# Patient Record
Sex: FEMALE | Race: WHITE | Employment: FULL TIME | URBAN - METROPOLITAN AREA
[De-identification: names, ages, dates, MRNs, and addresses within clinical notes are randomized per-mention and may not be internally consistent; named-entity substitution may affect disease eponyms.]

---

## 2017-05-17 ENCOUNTER — HOSPITAL ENCOUNTER (OUTPATIENT)
Dept: PREADMISSION TESTING | Age: 67
Discharge: HOME | End: 2017-05-17
Payer: COMMERCIAL

## 2017-05-17 DIAGNOSIS — M25.519 PAIN IN JOINT, SHOULDER REGION: ICD-10-CM

## 2017-05-17 LAB
ATRIAL RATE: 51 BPM
CALCULATED P AXIS, ECG09: 61 DEGREES
CALCULATED R AXIS, ECG10: 48 DEGREES
CALCULATED T AXIS, ECG11: 69 DEGREES
DIAGNOSIS, 93000: NORMAL
P-R INTERVAL, ECG05: 156 MS
Q-T INTERVAL, ECG07: 458 MS
QRS DURATION, ECG06: 88 MS
QTC CALCULATION (BEZET), ECG08: 422 MS
VENTRICULAR RATE, ECG03: 51 BPM

## 2017-05-17 PROCEDURE — 93005 ELECTROCARDIOGRAM TRACING: CPT

## 2024-01-31 ENCOUNTER — HOSPITAL ENCOUNTER (OUTPATIENT)
Facility: HOSPITAL | Age: 74
Setting detail: RECURRING SERIES
Discharge: HOME | End: 2024-02-03
Payer: MEDICARE

## 2024-01-31 PROCEDURE — 97110 THERAPEUTIC EXERCISES: CPT

## 2024-01-31 PROCEDURE — 97161 PT EVAL LOW COMPLEX 20 MIN: CPT

## 2024-01-31 NOTE — PROGRESS NOTES
PT DAILY TREATMENT NOTE/SHOULDER EVAL 10-18    Patient Name: Saige Shoemaker  Date:2024  : 1950  [x]  Patient  Verified  Payor: MEDICARE / Plan: MEDICARE PART A AND B / Product Type: *No Product type* /    In time:1330  Out time:1410  Total Treatment Time (min): 40  Visit #: 1 of 16    Medicare/BCBS Only   Total Timed Codes (min):  10 1:1 Treatment Time:  40       Treatment Area: Pain in left shoulder [M25.512]  Adhesive capsulitis of left shoulder [M75.02]    SUBJECTIVE  Pain Level (0-10 scale): 0-5  []constant [x]intermittent []improving []worsening []no change since onset    Any medication changes, allergies to medications, adverse drug reactions, diagnosis change, or new procedure performed?: [x] No    [] Yes (see summary sheet for update)  Subjective functional status/changes:       Patient presents with c/o left shoulder pain since 2023 with no known ALEXANDRA.  Patient describes pain as sharp, ache. Denies numbness/tingling. Denies popping/clicking. Aggravating factors:overhead activities, reaching behind, fastening a bra. Alleviating factors: IBP.  Denies red flags: SOB, chest pain, dizziness/lightheadedness, blurred/double vision, HA, chills/fevers, night sweats, change in bowel/bladder control, abdominal pain, difficulty swallowing, slurred speech, unexplained weight gain/loss, nausea, vomiting. PMHx: OA. Surgical Hx: right shoulder. Social Hx:  lives with spouse in a 2 story home, work status. PLOF: Retired. CLOF: Goes to [facility] weekly, yard work.  Diagnostic Imaging: X-ray: OA of the GH joint    OBJECTIVE/EXAMINATION    30 min [x]Eval                  []Re-Eval       10 min Therapeutic Exercise:  [x] See flow sheet :   Rationale: increase ROM, increase strength and decrease pain to improve the patient’s ability to complete ADLs            With   [x] TE   [] TA   [] neuro   [] other: Patient Education: [x] Review HEP    [] Progressed/Changed HEP based on:   [] positioning   [] body 
weeks:   Patient will report compliance with HEP a least 3-4x/week to aid in rehabilitation/strengthening program.   Status at IE: NA   Current:Same as IE     Patient will report no pain greater than 1-2/10 with overhead activities to aid in completion of ADLs.   Status at IE: 5/10   Current:Same as IE     Patient will increase bilateral UE strength to 5/5 throughout all planes to aid in completion of ADLs.   Status at IE: 4-/5   Current:Same as IE     Patient will increase FOTO score to 62 points overall to demonstrate improvement in functional status.    Status at IE: FOTO score = 46 (an established functional score where 100 = no disability)   Current:Same as IE   Frequency / Duration: Patient to be seen 2 times per week for 8 weeks    Patient/ Caregiver education and instruction: Diagnosis, prognosis, self care, activity modification, and exercises     [x]  Plan of care has been reviewed with PTA    Certification Period: 1/31/2024 - 4/30/2024  Diaz Cherry, PT 1/31/2024 5:33 PM  _____________________________________________________________________  I certify that the above Therapy Services are being furnished while the patient is under my care. I agree with the treatment plan and certify that this therapy is necessary.    Physician's Signature:_______________________ Date:_________ TIME:________                              Diaz Bell MD  Insurance: Payor: MEDICARE / Plan: MEDICARE PART A AND B / Product Type: *No Product type* /      ** Signature, Date and Time must be completed for valid certification **    [facility]  [address] [address]  Phone: [phone]   Fax: [phone]

## 2024-02-01 ENCOUNTER — TELEPHONE (OUTPATIENT)
Facility: HOSPITAL | Age: 74
End: 2024-02-01

## 2024-02-05 ENCOUNTER — TELEPHONE (OUTPATIENT)
Facility: HOSPITAL | Age: 74
End: 2024-02-05

## 2024-02-05 ENCOUNTER — HOSPITAL ENCOUNTER (OUTPATIENT)
Facility: HOSPITAL | Age: 74
Setting detail: RECURRING SERIES
Discharge: HOME | End: 2024-02-08
Payer: MEDICARE

## 2024-02-05 PROCEDURE — 97530 THERAPEUTIC ACTIVITIES: CPT

## 2024-02-05 PROCEDURE — 97112 NEUROMUSCULAR REEDUCATION: CPT

## 2024-02-05 PROCEDURE — 97110 THERAPEUTIC EXERCISES: CPT

## 2024-02-07 ENCOUNTER — HOSPITAL ENCOUNTER (OUTPATIENT)
Facility: HOSPITAL | Age: 74
Setting detail: RECURRING SERIES
Discharge: HOME | End: 2024-02-10
Payer: MEDICARE

## 2024-02-07 PROCEDURE — 97530 THERAPEUTIC ACTIVITIES: CPT

## 2024-02-07 PROCEDURE — 97112 NEUROMUSCULAR REEDUCATION: CPT

## 2024-02-07 PROCEDURE — 97110 THERAPEUTIC EXERCISES: CPT

## 2024-02-07 PROCEDURE — 97016 VASOPNEUMATIC DEVICE THERAPY: CPT

## 2024-02-07 NOTE — PROGRESS NOTES
PHYSICAL / OCCUPATIONAL THERAPY - DAILY TREATMENT NOTE     Patient Name: Saige Shoemaker    Date: 2024    : 1950  Insurance: Payor: MEDICARE / Plan: MEDICARE PART A AND B / Product Type: *No Product type* /      Patient  verified Yes     Visit #   Current / Total 3 16   Time   In / Out 0845 0927   Pain   In / Out 3 1   Subjective Functional Status/Changes: Patient reports that she is seeing a difference in mobility with exercise prescribed.     Changes to:  Allergies, Med Hx, Sx Hx?   no       TREATMENT AREA =  Pain in left shoulder [M25.512]    OBJECTIVE    Modalities Rationale:     decrease edema, decrease inflammation, decrease pain, increase tissue extensibility, and increase muscle contraction/control to improve patient's ability to progress to PLOF and address remaining functional goals.    10 min [x]  Vasopneumatic Device, press/temp: Med/34 degrees   If using vaso (only need to measure limb vaso being performed on)      pre-treatment girth : 44.0 cm      post-treatment girth : 44.0 cm      measured at (landmark location) :  left shoulder at acromion    min []  Other:    Skin assessment post-treatment (if applicable):    []  intact    []  redness- no adverse reaction                 []redness - adverse reaction:         Therapeutic Procedures:  Tx Min Billable or 1:1 Min (if diff from Tx Min) Procedure, Rationale, Specifics   20  21235 Therapeutic Exercise (timed):  increase ROM, strength, coordination, balance, and proprioception to improve patient's ability to progress to PLOF and address remaining functional goals. (see flow sheet as applicable)    Details if applicable:       15  01877 Neuromuscular Re-Education (timed):  improve balance, coordination, kinesthetic sense, posture, core stability and proprioception to improve patient's ability to develop conscious control of individual muscles and awareness of position of extremities in order to progress to PLOF and address remaining functional

## 2024-02-12 ENCOUNTER — APPOINTMENT (OUTPATIENT)
Facility: HOSPITAL | Age: 74
End: 2024-02-12
Payer: MEDICARE

## 2024-02-12 ENCOUNTER — TELEPHONE (OUTPATIENT)
Facility: HOSPITAL | Age: 74
End: 2024-02-12

## 2024-02-14 ENCOUNTER — HOSPITAL ENCOUNTER (OUTPATIENT)
Facility: HOSPITAL | Age: 74
Setting detail: RECURRING SERIES
Discharge: HOME | End: 2024-02-17
Payer: MEDICARE

## 2024-02-14 PROCEDURE — 97016 VASOPNEUMATIC DEVICE THERAPY: CPT

## 2024-02-14 PROCEDURE — 97110 THERAPEUTIC EXERCISES: CPT

## 2024-02-14 PROCEDURE — 97112 NEUROMUSCULAR REEDUCATION: CPT

## 2024-02-14 PROCEDURE — 97530 THERAPEUTIC ACTIVITIES: CPT

## 2024-02-14 NOTE — PROGRESS NOTES
analyze and address imbalance/dizziness, and instruct in home and community integration to address functional deficits and attain remaining goals.    Progress toward goals / Updated goals:  []  See Progress Note/Recertification    Short Term Goals: To be accomplished in 4 weeks:                 Patient will report compliance with HEP at least 1x/day to aid in rehabilitation program.                 Status at IE: provided initial HEP                  Current: In-progress, advanced HEP by adding isometrics, 2/7/2024                    Patient will display full left shoulder pain free AROM into flexion and abduction to aid in completion of ADLs.                 Status at IE: 5/10 pain 115 degrees flexion and 100 degrees abduction                 Current:In-progress, 120 degrees with 1-2/10, 2/14/2024     Long Term Goals: To be accomplished in 8 weeks:                 Patient will report compliance with HEP a least 3-4x/week to aid in rehabilitation/strengthening program.                 Status at IE: NA                 Current:Same as IE                    Patient will report no pain greater than 1-2/10 with overhead activities to aid in completion of ADLs.                 Status at IE: 5/10                 Current:Same as IE                    Patient will increase bilateral UE strength to 5/5 throughout all planes to aid in completion of ADLs.                 Status at IE: 4-/5                 Current:Same as IE                    Patient will increase FOTO score to 62 points overall to demonstrate improvement in functional status.                  Status at IE: FOTO score = 46 (an established functional score where 100 = no disability)                 Current:Same as IE     PLAN  Yes  Continue plan of care  []  Upgrade activities as tolerated  []  Discharge due to :  []  Other:    Diaz Cherry, PT    2/14/2024    8:53 AM    Future Appointments   Date Time Provider Department Center   2/20/2024  8:50 AM Jo Ann

## 2024-02-20 ENCOUNTER — HOSPITAL ENCOUNTER (OUTPATIENT)
Facility: HOSPITAL | Age: 74
Setting detail: RECURRING SERIES
Discharge: HOME | End: 2024-02-23
Payer: MEDICARE

## 2024-02-20 PROCEDURE — 97110 THERAPEUTIC EXERCISES: CPT

## 2024-02-20 PROCEDURE — 97016 VASOPNEUMATIC DEVICE THERAPY: CPT

## 2024-02-20 PROCEDURE — 97112 NEUROMUSCULAR REEDUCATION: CPT

## 2024-02-20 PROCEDURE — 97530 THERAPEUTIC ACTIVITIES: CPT

## 2024-02-20 NOTE — PROGRESS NOTES
modify for postural abnormalities, analyze and address imbalance/dizziness, and instruct in home and community integration to address functional deficits and attain remaining goals.    Progress toward goals / Updated goals:  []  See Progress Note/Recertification    Short Term Goals: To be accomplished in 4 weeks:                 Patient will report compliance with HEP at least 1x/day to aid in rehabilitation program.                 Status at IE: provided initial HEP                  Current: Met, okay                    Patient will display full left shoulder pain free AROM into flexion and abduction to aid in completion of ADLs.                 Status at IE: 5/10 pain 115 degrees flexion and 100 degrees abduction                 Current:In-progress, 120 degrees with 1-2/10, 2/14/2024     Long Term Goals: To be accomplished in 8 weeks:                 Patient will report compliance with HEP a least 3-4x/week to aid in rehabilitation/strengthening program.                 Status at IE: NA                 Current:Same as IE                    Patient will report no pain greater than 1-2/10 with overhead activities to aid in completion of ADLs.                 Status at IE: 5/10                 Current:Same as IE                    Patient will increase bilateral UE strength to 5/5 throughout all planes to aid in completion of ADLs.                 Status at IE: 4-/5                 Current:Same as IE                    Patient will increase FOTO score to 62 points overall to demonstrate improvement in functional status.                  Status at IE: FOTO score = 46 (an established functional score where 100 = no disability)                 Current:Same as IE     PLAN  Yes  Continue plan of care  []  Upgrade activities as tolerated  []  Discharge due to :  []  Other:    Diaz Cherry PT    2/20/2024    9:12 AM    Future Appointments   Date Time Provider Department Center   2/22/2024  8:50 AM Diaz Cherry, PT

## 2024-02-22 ENCOUNTER — HOSPITAL ENCOUNTER (OUTPATIENT)
Facility: HOSPITAL | Age: 74
Setting detail: RECURRING SERIES
Discharge: HOME | End: 2024-02-25
Payer: MEDICARE

## 2024-02-22 PROCEDURE — 97016 VASOPNEUMATIC DEVICE THERAPY: CPT

## 2024-02-22 PROCEDURE — 97110 THERAPEUTIC EXERCISES: CPT

## 2024-02-22 PROCEDURE — 97530 THERAPEUTIC ACTIVITIES: CPT

## 2024-02-22 PROCEDURE — 97112 NEUROMUSCULAR REEDUCATION: CPT

## 2024-02-22 NOTE — PROGRESS NOTES
PHYSICAL / OCCUPATIONAL THERAPY - DAILY TREATMENT NOTE     Patient Name: Saige Shoemaker    Date: 2024    : 1950  Insurance: Payor: MEDICARE / Plan: MEDICARE PART A AND B / Product Type: *No Product type* /      Patient  verified Yes     Visit #   Current / Total 6 16   Time   In / Out 0850 0950   Pain   In / Out 0 0   Subjective Functional Status/Changes: Patient that she she has less pain today.    Changes to:  Allergies, Med Hx, Sx Hx?   no       TREATMENT AREA =  Pain in left shoulder [M25.512]    OBJECTIVE    Modalities Rationale:     decrease edema, decrease inflammation, decrease pain, increase tissue extensibility, and increase muscle contraction/control to improve patient's ability to progress to PLOF and address remaining functional goals.    10 min [x]  Vasopneumatic Device, press/temp: Med/34 degrees   If using vaso (only need to measure limb vaso being performed on)      pre-treatment girth : 44.7 cm      post-treatment girth : 44.3 cm      measured at (landmark location) :  left shoulder at acromion    min []  Other:    Skin assessment post-treatment (if applicable):    []  intact    []  redness- no adverse reaction                 []redness - adverse reaction:         Therapeutic Procedures:  Tx Min Billable or 1:1 Min (if diff from Tx Min) Procedure, Rationale, Specifics   25  50140 Therapeutic Exercise (timed):  increase ROM, strength, coordination, balance, and proprioception to improve patient's ability to progress to PLOF and address remaining functional goals. (see flow sheet as applicable)    Details if applicable:       10  86888 Neuromuscular Re-Education (timed):  improve balance, coordination, kinesthetic sense, posture, core stability and proprioception to improve patient's ability to develop conscious control of individual muscles and awareness of position of extremities in order to progress to PLOF and address remaining functional goals. (see flow sheet as

## 2024-02-26 ENCOUNTER — HOSPITAL ENCOUNTER (OUTPATIENT)
Facility: HOSPITAL | Age: 74
Setting detail: RECURRING SERIES
Discharge: HOME | End: 2024-02-29
Payer: MEDICARE

## 2024-02-26 PROCEDURE — 97112 NEUROMUSCULAR REEDUCATION: CPT

## 2024-02-26 PROCEDURE — 97530 THERAPEUTIC ACTIVITIES: CPT

## 2024-02-26 PROCEDURE — 97016 VASOPNEUMATIC DEVICE THERAPY: CPT

## 2024-02-26 PROCEDURE — 97140 MANUAL THERAPY 1/> REGIONS: CPT

## 2024-02-26 PROCEDURE — 97110 THERAPEUTIC EXERCISES: CPT

## 2024-02-26 NOTE — PROGRESS NOTES
Other:    Ilir Bolanos, PT    2/26/2024    2:30 PM    Future Appointments   Date Time Provider Department Center   3/5/2024  8:10 AM Diaz Cherry, PT [facility]   3/7/2024 12:10 PM Diaz Cherry, PT [facility]   3/12/2024  8:10 AM Diaz Cherry, PT [facility]   3/14/2024  8:10 AM Diaz Cherry, PT [facility]   3/19/2024  8:10 AM Diaz Cherry, PT [facility]

## 2024-03-05 ENCOUNTER — HOSPITAL ENCOUNTER (OUTPATIENT)
Facility: HOSPITAL | Age: 74
Setting detail: RECURRING SERIES
Discharge: HOME | End: 2024-03-08
Payer: MEDICARE

## 2024-03-05 PROCEDURE — 97530 THERAPEUTIC ACTIVITIES: CPT

## 2024-03-05 PROCEDURE — 97140 MANUAL THERAPY 1/> REGIONS: CPT

## 2024-03-05 PROCEDURE — 97112 NEUROMUSCULAR REEDUCATION: CPT

## 2024-03-05 PROCEDURE — 97110 THERAPEUTIC EXERCISES: CPT

## 2024-03-05 NOTE — PROGRESS NOTES
[facility]  [address] [address]            Phone: [phone]   Fax: [phone]    Progress Note  Patient name: Saige Shoemaker Start of Care:  2024    Referral source: Diaz Bell MD : 1950   Medical/Treatment Diagnosis: Pain in left shoulder [M25.512] Onset Date:2023      Prior Hospitalization: see medical history Provider#: [id number]   Medications: Verified on Patient Summary List    Comorbidities:  OA   Prior Level of Function:Goes to [facility] weekly, yard work, retired    Visits from Start of Care: 8        Updated Goals/Measure of Progress:     Short Term Goals: To be accomplished in 4 weeks:                 Patient will report compliance with HEP at least 1x/day to aid in rehabilitation program.                 Status at IE: provided initial HEP                  Current: Met, 2024                    Patient will display full left shoulder pain free AROM into flexion and abduction to aid in completion of ADLs.                 Status at IE: 5/10 pain 115 degrees flexion and 100 degrees abduction                 Current:In-progress, 145 degrees flexion and 20 abduction with 1-2/10, 3/5/2024     Long Term Goals: To be accomplished in 8 weeks:                 Patient will report compliance with HEP a least 3-4x/week to aid in rehabilitation/strengthening program.                 Status at IE: NA                 Current:  In-progress, developing consistency, 3/5/2024                     Patient will report no pain greater than 1-2/10 with overhead activities to aid in completion of ADLs.                 Status at IE: 5/10                 Current: In-progress, 2-3 with OH activities, 3/5/2024                    Patient will increase bilateral UE strength to 5/5 throughout all planes to aid in completion of ADLs.                 Status at IE: 4-/                 Current: In-progress, , 3/5/2024                    Patient will increase

## 2024-03-07 ENCOUNTER — HOSPITAL ENCOUNTER (OUTPATIENT)
Facility: HOSPITAL | Age: 74
Setting detail: RECURRING SERIES
Discharge: HOME | End: 2024-03-10
Payer: MEDICARE

## 2024-03-07 PROCEDURE — 97110 THERAPEUTIC EXERCISES: CPT

## 2024-03-07 PROCEDURE — 97112 NEUROMUSCULAR REEDUCATION: CPT

## 2024-03-07 PROCEDURE — 97530 THERAPEUTIC ACTIVITIES: CPT

## 2024-03-07 PROCEDURE — 97016 VASOPNEUMATIC DEVICE THERAPY: CPT

## 2024-03-07 NOTE — PROGRESS NOTES
Discharge due to :  []  Other:    Diaz Cherry, PT    3/7/2024    12:16 PM    Future Appointments   Date Time Provider Department Center   3/12/2024  8:10 AM Diaz Cherry, PT [facility]   3/14/2024  8:10 AM Diaz Cherry, PT [facility]   3/19/2024  8:10 AM Diaz Cherry, PT [facility]

## 2024-03-11 ENCOUNTER — HOSPITAL ENCOUNTER (OUTPATIENT)
Facility: HOSPITAL | Age: 74
Setting detail: RECURRING SERIES
Discharge: HOME | End: 2024-03-14
Payer: MEDICARE

## 2024-03-11 PROCEDURE — 97110 THERAPEUTIC EXERCISES: CPT

## 2024-03-11 PROCEDURE — 97140 MANUAL THERAPY 1/> REGIONS: CPT

## 2024-03-11 PROCEDURE — 97112 NEUROMUSCULAR REEDUCATION: CPT

## 2024-03-11 PROCEDURE — 97530 THERAPEUTIC ACTIVITIES: CPT

## 2024-03-11 NOTE — PROGRESS NOTES
as tolerated  []  Discharge due to :  []  Other:    Diaz Cherry, PT    3/11/2024    8:57 AM    Future Appointments   Date Time Provider Department Center   3/14/2024  8:10 AM Diaz Cherry, PT [facility]   3/19/2024  8:10 AM Diaz Cherry, PT [facility]

## 2024-03-14 ENCOUNTER — APPOINTMENT (OUTPATIENT)
Facility: HOSPITAL | Age: 74
End: 2024-03-14
Payer: MEDICARE

## 2024-03-19 ENCOUNTER — HOSPITAL ENCOUNTER (OUTPATIENT)
Facility: HOSPITAL | Age: 74
Setting detail: RECURRING SERIES
Discharge: HOME | End: 2024-03-22
Payer: MEDICARE

## 2024-03-19 PROCEDURE — 97530 THERAPEUTIC ACTIVITIES: CPT

## 2024-03-19 PROCEDURE — 97112 NEUROMUSCULAR REEDUCATION: CPT

## 2024-03-19 PROCEDURE — 97110 THERAPEUTIC EXERCISES: CPT

## 2024-03-19 NOTE — PROGRESS NOTES
tissue restrictions, analyze and cue for proper movement patterns, analyze and modify for postural abnormalities, analyze and address imbalance/dizziness, and instruct in home and community integration to address functional deficits and attain remaining goals.    Progress toward goals / Updated goals:  []  See Progress Note/Recertification    Short Term Goals: To be accomplished in 4 weeks:                 Patient will report compliance with HEP at least 1x/day to aid in rehabilitation program.                 Status at IE: provided initial HEP                  Current: Met, 2/22/2024                    Patient will display full left shoulder pain free AROM into flexion and abduction to aid in completion of ADLs.                 Status at IE: 5/10 pain 115 degrees flexion and 100 degrees abduction                 Current:In-progress, 120 degrees with 1-2/10, 2/14/2024     Long Term Goals: To be accomplished in 8 weeks:                 Patient will report compliance with HEP a least 3-4x/week to aid in rehabilitation/strengthening program.                 Status at IE: NA                 Current:  In-progress, developing consistency, 3/5/2024                     Patient will report no pain greater than 1-2/10 with overhead activities to aid in completion of ADLs.                 Status at IE: 5/10                 Current: In-progress, 2-3 with OH press with 1# 2 x 10, 3/11/2024                    Patient will increase bilateral UE strength to 5/5 throughout all planes to aid in completion of ADLs.                 Status at IE: 4-/5                 Current: In-progress, 4/5, 3/5/2024                    Patient will increase FOTO score to 62 points overall to demonstrate improvement in functional status.                  Status at IE: FOTO score = 46 (an established functional score where 100 = no disability)                 Current: In-progress, 67, 3/5/2024    PLAN  Yes  Continue plan of care  []  Upgrade activities

## 2024-03-26 ENCOUNTER — HOSPITAL ENCOUNTER (OUTPATIENT)
Facility: HOSPITAL | Age: 74
Setting detail: RECURRING SERIES
Discharge: HOME | End: 2024-03-29
Payer: MEDICARE

## 2024-03-26 PROCEDURE — 97112 NEUROMUSCULAR REEDUCATION: CPT

## 2024-03-26 PROCEDURE — 97530 THERAPEUTIC ACTIVITIES: CPT

## 2024-03-26 PROCEDURE — 97110 THERAPEUTIC EXERCISES: CPT

## 2024-03-26 NOTE — PROGRESS NOTES
applicable)    Details if applicable:     20  35154 Therapeutic Activity (timed):  use of dynamic activities replicating functional movements to increase ROM, strength, coordination, balance, and proprioception in order to improve patient's ability to progress to PLOF and address remaining functional goals.  (see flow sheet as applicable)     Details if applicable:     0  08530 Manual Therapy (timed):  decrease pain, increase ROM, increase tissue extensibility, decrease edema, decrease trigger points, and increase postural awareness to improve patient's ability to progress to PLOF and address remaining functional goals.  The manual therapy interventions were performed at a separate and distinct time from the therapeutic activities interventions . Details:      Details if applicable:  inf and post GHJ mobilizations grade I-IV, PROM & gentle shoulder stretching to end range left shoulder all planes   60  MC BC Totals Reminder: bill using total billable min of TIMED therapeutic procedures (example: do not include dry needle or estim unattended, both untimed codes, in totals to left)  8-22 min = 1 unit; 23-37 min = 2 units; 38-52 min = 3 units; 53-67 min = 4 units; 68-82 min = 5 units   Total Total     TOTAL TREATMENT TIME:   60     [x]  Patient Education billed concurrently with other procedures   [x] Review HEP    [] Progressed/Changed HEP, detail:    [] Other detail:       Objective Information/Functional Measures/Assessment  Patient demonstrated improved tolerance to resistance exercise. Will increase exercise intensity as tolerated.       Patient will continue to benefit from skilled PT services to modify and progress therapeutic interventions, analyze and address functional mobility deficits, analyze and address ROM deficits, analyze and address strength deficits, analyze and address soft tissue restrictions, analyze and cue for proper movement patterns, analyze and modify for postural abnormalities, analyze and

## 2024-04-02 ENCOUNTER — HOSPITAL ENCOUNTER (OUTPATIENT)
Facility: HOSPITAL | Age: 74
Setting detail: RECURRING SERIES
Discharge: HOME | End: 2024-04-05
Payer: MEDICARE

## 2024-04-02 PROCEDURE — 97530 THERAPEUTIC ACTIVITIES: CPT

## 2024-04-02 PROCEDURE — 97110 THERAPEUTIC EXERCISES: CPT

## 2024-04-02 NOTE — PROGRESS NOTES
PHYSICAL / OCCUPATIONAL THERAPY - DAILY TREATMENT NOTE     Patient Name: Saige Shoemaker    Date: 2024    : 1950  Insurance: Payor: MEDICARE / Plan: MEDICARE PART A AND B / Product Type: *No Product type* /      Patient  verified Yes     Visit #   Current / Total 13 16   Time   In / Out 1010 1050   Pain   In / Out 0 0   Subjective Functional Status/Changes: Patient reports that she has been feeling better.     Changes to:  Allergies, Med Hx, Sx Hx?   no       TREATMENT AREA =  Pain in left shoulder [M25.512]    OBJECTIVE    Modalities Rationale:     decrease edema, decrease inflammation, decrease pain, increase tissue extensibility, and increase muscle contraction/control to improve patient's ability to progress to PLOF and address remaining functional goals.    0 min [x]  Vasopneumatic Device, press/temp: Med/34 degrees   If using vaso (only need to measure limb vaso being performed on)      pre-treatment girth : 44.4 cm      post-treatment girth : 44.2 cm      measured at (landmark location) :  left shoulder at acromion    min []  Other:    Skin assessment post-treatment (if applicable):    []  intact    []  redness- no adverse reaction                 []redness - adverse reaction:         Therapeutic Procedures:  Tx Min Billable or 1:1 Min (if diff from Tx Min) Procedure, Rationale, Specifics   25  12853 Therapeutic Exercise (timed):  increase ROM, strength, coordination, balance, and proprioception to improve patient's ability to progress to PLOF and address remaining functional goals. (see flow sheet as applicable)    Details if applicable:       0  68596 Neuromuscular Re-Education (timed):  improve balance, coordination, kinesthetic sense, posture, core stability and proprioception to improve patient's ability to develop conscious control of individual muscles and awareness of position of extremities in order to progress to PLOF and address remaining functional goals. (see flow sheet as

## 2024-04-02 NOTE — PROGRESS NOTES
[facility]  [address] [address]  Phone: [phone]   Fax: [phone]    Discharge Summary    Patient name: Saige Shoemaker     Start of Care: 2024    Referral source: Diaz Bell MD    : 1950  Medical/Treatment Diagnosis: Pain in left shoulder [M25.512]  Onset Date:2023    Prior Hospitalization: see medical history   Provider#: [id number]  Medications: Verified on Patient Summary List    Comorbidities: OA    Prior Level of Function:Goes to [facility] weekly, yard work, retired     Visits from Start of Care: 13       Goals/Measure of Progress:  Short Term Goals: To be accomplished in 4 weeks:                 Patient will report compliance with HEP at least 1x/day to aid in rehabilitation program.                 Status at IE: provided initial HEP                  Current: Met, 2024                    Patient will display full left shoulder pain free AROM into flexion and abduction to aid in completion of ADLs.                 Status at IE: 5/10 pain 115 degrees flexion and 100 degrees abduction                 Current: Met, 2024     Long Term Goals: To be accomplished in 8 weeks:                 Patient will report compliance with HEP a least 3-4x/week to aid in rehabilitation/strengthening program.                 Status at IE: NA                 Current:  In-progress, developing consistency, 3/5/2024                     Patient will report no pain greater than 1-2/10 with overhead activities to aid in completion of ADLs.                 Status at IE: 5/10                 Current: Met, 1-2/10 with OH activities, 2024                    Patient will increase bilateral UE strength to 5/5 throughout all planes to aid in completion of ADLs.                 Status at IE:                  Current: Partially met, 4+/5, 2024                    Patient will increase FOTO score to 62 points overall to demonstrate improvement in

## 2024-04-09 ENCOUNTER — APPOINTMENT (OUTPATIENT)
Facility: HOSPITAL | Age: 74
End: 2024-04-09
Payer: MEDICARE